# Patient Record
Sex: FEMALE | Race: BLACK OR AFRICAN AMERICAN | URBAN - METROPOLITAN AREA
[De-identification: names, ages, dates, MRNs, and addresses within clinical notes are randomized per-mention and may not be internally consistent; named-entity substitution may affect disease eponyms.]

---

## 2018-01-03 ENCOUNTER — OFFICE VISIT (OUTPATIENT)
Dept: FAMILY MEDICINE CLINIC | Age: 28
End: 2018-01-03

## 2018-01-03 VITALS
BODY MASS INDEX: 42.13 KG/M2 | WEIGHT: 209 LBS | SYSTOLIC BLOOD PRESSURE: 138 MMHG | DIASTOLIC BLOOD PRESSURE: 77 MMHG | OXYGEN SATURATION: 98 % | HEART RATE: 82 BPM | TEMPERATURE: 97.6 F | HEIGHT: 59 IN | RESPIRATION RATE: 20 BRPM

## 2018-01-03 DIAGNOSIS — Z13.0 SCREENING FOR ENDOCRINE, METABOLIC AND IMMUNITY DISORDER: ICD-10-CM

## 2018-01-03 DIAGNOSIS — Z13.6 ENCOUNTER FOR LIPID SCREENING FOR CARDIOVASCULAR DISEASE: ICD-10-CM

## 2018-01-03 DIAGNOSIS — Z13.220 ENCOUNTER FOR LIPID SCREENING FOR CARDIOVASCULAR DISEASE: ICD-10-CM

## 2018-01-03 DIAGNOSIS — Z11.3 SCREENING FOR BACTERIAL AND SPIROCHETAL SEXUALLY TRANSMITTED DISEASES: ICD-10-CM

## 2018-01-03 DIAGNOSIS — Z11.59 SCREENING FOR VIRAL DISEASE: ICD-10-CM

## 2018-01-03 DIAGNOSIS — Z13.29 SCREENING FOR ENDOCRINE, METABOLIC AND IMMUNITY DISORDER: ICD-10-CM

## 2018-01-03 DIAGNOSIS — Z13.228 SCREENING FOR ENDOCRINE, METABOLIC AND IMMUNITY DISORDER: ICD-10-CM

## 2018-01-03 DIAGNOSIS — E66.01 MORBID OBESITY WITH BMI OF 40.0-44.9, ADULT (HCC): ICD-10-CM

## 2018-01-03 DIAGNOSIS — Z13.1 SCREENING FOR DIABETES MELLITUS: ICD-10-CM

## 2018-01-03 DIAGNOSIS — Z00.00 PHYSICAL EXAM, ANNUAL: Primary | ICD-10-CM

## 2018-01-03 DIAGNOSIS — Z11.3 SCREEN FOR STD (SEXUALLY TRANSMITTED DISEASE): ICD-10-CM

## 2018-01-03 NOTE — PROGRESS NOTES
Chief Complaint   Patient presents with   [facility].    Complete Physical     1. Have you been to the ER, urgent care clinic since your last visit? Hospitalized since your last visit? No    2. Have you seen or consulted any other health care providers outside of the [address] [place] [address] since your last visit? Include any pap smears or colon screening.  No

## 2018-01-03 NOTE — PATIENT INSTRUCTIONS
Learning About Healthy Weight  What is a healthy weight? A healthy weight is the weight at which you feel good about yourself and have energy for work and play. It's also one that lowers your risk for health problems. What can you do to stay at a healthy weight? It can be hard to stay at a healthy weight, especially when fast food, vending-machine snacks, and processed foods are so easy to find. And with your busy lifestyle, activity may be low on your list of things to do. But staying at a healthy weight may be easier than you think. Here are some dos and don'ts for staying at a healthy weight:  Do eat healthy foods  The kinds of foods you eat have a big impact on both your weight and your health. Reaching and staying at a healthy weight is not about going on a diet. It's about making healthier food choices every day and changing your diet for good. Healthy eating means eating a variety of foods so that you get all the nutrients you need. Your body needs protein, carbohydrate, and fats for energy. They keep your heart beating, your brain active, and your muscles working. On most days, try to eat from each food group. This means eating a variety of:  · Whole grains, such as whole wheat breads and pastas. · Fruits and vegetables. · Dairy products, such as low-fat milk, yogurt, and cheese. · Lean proteins, such as all types of fish, chicken without the skin, and beans. Don't have too much or too little of one thing. All foods, if eaten in moderation, can be part of healthy eating. Even sweets can be okay. If your favorite foods are high in fat, salt, sugar, or calories, limit how often you eat them. Eat smaller servings, or look for healthy substitutes. Do watch what you eat  Many people eat more than their bodies need. Part of staying at a healthy weight means learning how much food you really need from day to day and not eating more than that.  Even with healthy foods, eating too much can make you gain weight. Having a well-balanced diet means that you eat enough, but not too much, and that your food gives you the nutrients you need to stay healthy. So listen to your body. Eat when you're hungry. Stop when you feel satisfied. It's a good idea to have healthy snacks ready for when you get hungry. Keep healthy snacks with you at work, in your car, and at home. If you have a healthy snack easily available, you'll be less likely to pick a candy bar or bag of chips from a vending machine instead. Some healthy snacks you might want to keep on hand are fruit, low-fat yogurt, string cheese, low-fat microwave popcorn, raisins and other dried fruit, nuts, whole wheat crackers, pretzels, carrots, celery sticks, and broccoli. Do some physical activity  A big part of reaching and staying at a healthy weight is being active. When you're active, you burn calories. This makes it easier to reach and stay at a healthy weight. When you're active on a regular basis, your body burns more calories, even when you're at rest. Being active helps you lose fat and build lean muscle. Try to be active for at least 1 hour every day. This may sound like a lot, but it's okay to be active in smaller blocks of time that add up to 1 hour a day. Any activity that makes your heart beat faster and keeps it there for a while counts. A brisk walk, run, or swim will get your heart beating faster. So will climbing stairs, shooting baskets, or cycling. Even some household chores like vacuuming and mowing the lawn will get your heart rate up. Pick activities that you enjoy-ones that make your heart beat faster, your muscles stronger, and your muscles and joints more flexible. If you find more than one thing you like doing, do them all. You don't have to do the same thing every day. Don't diet  Diets don't work. Diets are temporary. Because you give up so much when you diet, you may be hungry and think about food all the time.  And after you stop dieting, you also may overeat to make up for what you missed. Most people who diet end up gaining back the pounds they lost-and more. Remember that healthy bodies come in lots of shapes and sizes. Everyone can get healthier by eating better and being more active. Where can you learn more? Go to http://savanah-raudel.info/. Enter 217 0402 in the search box to learn more about \"Learning About Healthy Weight. \"  Current as of: October 13, 2016  Content Version: 11.4  © 5252-5898 Healthwise, Incorporated. Care instructions adapted under license by [employer] (which disclaims liability or warranty for this information). If you have questions about a medical condition or this instruction, always ask your healthcare professional. [address] any warranty or liability for your use of this information.

## 2018-01-03 NOTE — PROGRESS NOTES
[facility] OFFICE NOTE    Martha Cardenas is a 32 y.o. female presenting today for office visit. 1/3/2018  12:41 PM    Chief Complaint   Patient presents with    Establish Care    Complete Physical       HPI: Here today as a new patient, establishing care. Reports no previous PCP. Does not follow with any specialists. Would like to have routine labs completed. No complaints today. Review of Systems   Constitutional: Negative for chills, fatigue and fever. Respiratory: Negative for cough, shortness of breath and wheezing. Cardiovascular: Negative for chest pain, palpitations and leg swelling. Gastrointestinal: Negative for abdominal pain, constipation, diarrhea, nausea and vomiting. Genitourinary: Negative for difficulty urinating and frequency. Musculoskeletal: Negative for arthralgias and myalgias. Skin: Negative for rash. Neurological: Negative for dizziness and headaches. PHQ Screening   PHQ over the last two weeks 1/3/2018   Little interest or pleasure in doing things Not at all   Feeling down, depressed or hopeless Not at all   Total Score PHQ 2 0         History  No past medical history on file. No past surgical history on file. Social History     Social History    Marital status: UNKNOWN     Spouse name: N/A    Number of children: N/A    Years of education: N/A     Occupational History    Not on file. Social History Main Topics    Smoking status: Former Smoker    Smokeless tobacco: Former User    Alcohol use No    Drug use: No    Sexual activity: Not on file     Other Topics Concern    Not on file     Social History Narrative    No narrative on file       Family History   Problem Relation Age of Onset    No Known Problems Mother     No Known Problems Father     Asthma Sister     Asthma Brother        No Known Allergies    No current daily medications      Health Maintenance and Screenings  Colon Cancer: . Not indicated for early screening   COPD Screen/ Lung Cancer: Nonsmoker  CAD Screen: Lipid panel ordered  Diabetes: Glucose ordered  STD and HIV Screen: She opts to have done with labs- ordered  Breast Cancer: Not indicated for early screening   Cervical Cancer: Well woman recommended  Osteoporosis Screen: Not indicated for early screening   Abdominal Aneurysm Screen: Not indicated  Opthalmology:   Dentistry Services:   Hearing Impairment:   Skin Cancer Screen:   Obesity Screen: Body mass index is 42.21 kg/(m^2). Flu Vaccination: Declines  Pneumococcal Vaccine: Not indicated for early vaccination   Shingles Vaccine: Not indicated for early vaccination   Tetanus Booster: Last booster in 2012- UTD  Hepatitis B Vaccinations: Unsure if she had series- declines  HPV series:     *will follow up on      Advance Care Planning:   Patient was offered the opportunity to discuss advance care planning NO   Does patient have an Advance Directive:  NO   If no, did you provide information on Caring Connections? Patient Care Team:  Patient Care Team:  Jennifer Gonzalez NP as PCP - General (Nurse Practitioner)        LABS:  None new to review    RADIOLOGY:  None new to review        Physical Exam   Constitutional: She is oriented to person, place, and time. She appears well-developed and well-nourished. No distress. Neck: Normal range of motion. Neck supple. No thyromegaly present. Cardiovascular: Normal rate, regular rhythm and normal heart sounds. No murmur heard. Pulmonary/Chest: Effort normal and breath sounds normal. No respiratory distress. Abdominal: Soft. Bowel sounds are normal. There is no tenderness. Musculoskeletal: She exhibits no edema. Neurological: She is alert and oriented to person, place, and time. She exhibits normal muscle tone. Coordination and gait normal.   Skin: Skin is warm and dry.          Vitals:    01/03/18 1237   BP: 138/77   Pulse: 82   Resp: 18   Temp: 97.6 °F (36.4 °C)   TempSrc: Oral   SpO2: 98%   Weight: 209 lb (94.8 kg) Height: 4' 11\" (1.499 m)   PainSc:   0 - No pain   LMP: 12/25/2017         Assessment and Plan    Physical exam, annual  *CPE completed today in office. Preventative plan established. Declines PAP today- will come in for well woman. Morbid obesity with BMI of 40.0-44.9, adult ([facility] [address]  *I have reviewed/discussed the above normal BMI with the patient. I have recommended the following interventions: dietary management education, guidance, and counseling, encourage exercise and monitor weight . Screening for diabetes mellitus  - METABOLIC PANEL, COMPREHENSIVE; Future    Encounter for lipid screening for cardiovascular disease  - LIPID PANEL; Future    Screening for endocrine, metabolic and immunity disorder  - TSH 3RD GENERATION; Future  - URINALYSIS W/MICROSCOPIC; Future  - CBC W/O DIFF; Future    Screening for viral disease/ Screening for bacterial and spirochetal sexually transmitted diseases/ Screen for STD (sexually transmitted disease)  - RPR; Future  - HEPATITIS C AB; Future  - HEP B SURFACE AG; Future  - HIV 1/2 AG/AB, 4TH GENERATION,W RFLX CONFIRM; Future          *Plan of care reviewed with patient. Patient in agreement with plan and expresses understanding. All questions answered and patient encouraged to call or RTO if further questions or concerns. Follow-up Disposition:  Return for well-woman visit with fasting labs- 30 mins.

## 2018-01-03 NOTE — MR AVS SNAPSHOT
Visit Information Date & Time Provider Department Dept. Phone Encounter #  
 1/3/2018 12:30 PM Yuly Poon NP [facility] [id number] Follow-up Instructions Return for well-woman visit with fasting labs- 30 mins. Upcoming Health Maintenance Date Due DTaP/Tdap/Td series (1 - Tdap) 5/9/2011 PAP AKA CERVICAL CYTOLOGY 5/9/2011 Allergies as of 1/3/2018  Review Complete On: 1/3/2018 By: Yuly Poon NP No Known Allergies Current Immunizations  Never Reviewed Name Date Tdap 7/4/2012 12:00 AM  
  
 Not reviewed this visit You Were Diagnosed With   
  
 Codes Comments Physical exam, annual    -  Primary ICD-10-CM: Z00.00 ICD-9-CM: V70.0 Morbid obesity with BMI of 40.0-44.9, adult (HCC)     ICD-10-CM: E66.01, Z68.41 
ICD-9-CM: 278.01, V85.41 Screening for diabetes mellitus     ICD-10-CM: Z13.1 ICD-9-CM: V77.1 Encounter for lipid screening for cardiovascular disease     ICD-10-CM: Z13.220, Z13.6 ICD-9-CM: V77.91, V81.2 Screening for endocrine, metabolic and immunity disorder     ICD-10-CM: Z13.29, Z13.228, Z13.0 ICD-9-CM: V77.99 Encounter for laboratory examination     ICD-10-CM: Z01.89 ICD-9-CM: V72.60 Screen for STD (sexually transmitted disease)     ICD-10-CM: Z11.3 ICD-9-CM: V74.5 Screening for viral disease     ICD-10-CM: Z11.59 
ICD-9-CM: V73.99 Screening for bacterial and spirochetal sexually transmitted diseases     ICD-10-CM: Z11.3 ICD-9-CM: V74.5 Vitals BP Pulse Temp Resp Height(growth percentile) Weight(growth percentile) 138/77 (BP 1 Location: Left arm, BP Patient Position: Sitting) 82 97.6 °F (36.4 °C) (Oral) 20 4' 11\" (1.499 m) 209 lb (94.8 kg) LMP SpO2 BMI Smoking Status 12/25/2017 98% 42.21 kg/m2 Never Smoker Vitals History BMI and BSA Data  Body Mass Index Body Surface Area  
 42.21 kg/m 2 1.99 m 2  
  
  
 Your Updated Medication List  
  
Notice  As of 1/3/2018 12:57 PM  
 You have not been prescribed any medications. Follow-up Instructions Return for well-woman visit with fasting labs- 30 mins. To-Do List   
 01/03/2018 Lab:  HEP B SURFACE AG   
  
 01/03/2018 Lab:  HEPATITIS C AB   
  
 01/03/2018 Lab:  HIV 1/2 AG/AB, 4TH GENERATION,W RFLX CONFIRM Around 01/04/2018 Lab:  CBC W/O DIFF Around 01/04/2018 Lab:  LIPID PANEL Around 01/04/2018 Lab:  METABOLIC PANEL, COMPREHENSIVE Around 01/04/2018 Lab:  RPR Around 01/04/2018 Lab:  TSH 3RD GENERATION Around 01/04/2018 Lab:  URINALYSIS W/MICROSCOPIC Patient Instructions Learning About Healthy Weight What is a healthy weight? A healthy weight is the weight at which you feel good about yourself and have energy for work and play. It's also one that lowers your risk for health problems. What can you do to stay at a healthy weight? It can be hard to stay at a healthy weight, especially when fast food, vending-machine snacks, and processed foods are so easy to find. And with your busy lifestyle, activity may be low on your list of things to do. But staying at a healthy weight may be easier than you think. Here are some dos and don'ts for staying at a healthy weight: 
Do eat healthy foods The kinds of foods you eat have a big impact on both your weight and your health. Reaching and staying at a healthy weight is not about going on a diet. It's about making healthier food choices every day and changing your diet for good. Healthy eating means eating a variety of foods so that you get all the nutrients you need. Your body needs protein, carbohydrate, and fats for energy. They keep your heart beating, your brain active, and your muscles working. On most days, try to eat from each food group. This means eating a variety of: · Whole grains, such as whole wheat breads and pastas. · Fruits and vegetables. · Dairy products, such as low-fat milk, yogurt, and cheese. · Lean proteins, such as all types of fish, chicken without the skin, and beans. Don't have too much or too little of one thing. All foods, if eaten in moderation, can be part of healthy eating. Even sweets can be okay. If your favorite foods are high in fat, salt, sugar, or calories, limit how often you eat them. Eat smaller servings, or look for healthy substitutes. Do watch what you eat Many people eat more than their bodies need. Part of staying at a healthy weight means learning how much food you really need from day to day and not eating more than that. Even with healthy foods, eating too much can make you gain weight. Having a well-balanced diet means that you eat enough, but not too much, and that your food gives you the nutrients you need to stay healthy. So listen to your body. Eat when you're hungry. Stop when you feel satisfied. It's a good idea to have healthy snacks ready for when you get hungry. Keep healthy snacks with you at work, in your car, and at home. If you have a healthy snack easily available, you'll be less likely to pick a candy bar or bag of chips from a vending machine instead. Some healthy snacks you might want to keep on hand are fruit, low-fat yogurt, string cheese, low-fat microwave popcorn, raisins and other dried fruit, nuts, whole wheat crackers, pretzels, carrots, celery sticks, and broccoli. Do some physical activity A big part of reaching and staying at a healthy weight is being active. When you're active, you burn calories. This makes it easier to reach and stay at a healthy weight. When you're active on a regular basis, your body burns more calories, even when you're at rest. Being active helps you lose fat and build lean muscle. Try to be active for at least 1 hour every day.  This may sound like a lot, but it's okay to be active in smaller blocks of time that add up to 1 hour a day. Any activity that makes your heart beat faster and keeps it there for a while counts. A brisk walk, run, or swim will get your heart beating faster. So will climbing stairs, shooting baskets, or cycling. Even some household chores like vacuuming and mowing the lawn will get your heart rate up. Pick activities that you enjoy-ones that make your heart beat faster, your muscles stronger, and your muscles and joints more flexible. If you find more than one thing you like doing, do them all. You don't have to do the same thing every day. Don't diet Diets don't work. Diets are temporary. Because you give up so much when you diet, you may be hungry and think about food all the time. And after you stop dieting, you also may overeat to make up for what you missed. Most people who diet end up gaining back the pounds they lost-and more. Remember that healthy bodies come in lots of shapes and sizes. Everyone can get healthier by eating better and being more active. Where can you learn more? Go to http://savanahNanoDynamicsraudel.info/. Enter 768 7869 in the search box to learn more about \"Learning About Healthy Weight. \" Current as of: October 13, 2016 Content Version: 11.4 © 2294-2231 Healthwise, Incorporated. Care instructions adapted under license by [employer] (which disclaims liability or warranty for this information). If you have questions about a medical condition or this instruction, always ask your healthcare professional. [place] [address] any warranty or liability for your use of this information. Introducing [address]! [facility] introduces [employer] patient portal. Now you can access parts of your medical record, email your doctor's office, and request medication refills online. 1. In your internet browser, go to [email or web address] 2. Click on the First Time User? Click Here link in the Sign In box. You will see the New Member Sign Up page. 3. Enter your Convoe Access Code exactly as it appears below. You will not need to use this code after youve completed the sign-up process. If you do not sign up before the expiration date, you must request a new code. · Convoe Access Code: [email or web address] Expires: 1/16/2018  8:35 AM 
 
4. Enter the last four digits of your Social Security Number (xxxx) and Date of Birth (mm/dd/yyyy) as indicated and click Submit. You will be taken to the next sign-up page. 5. Create a Convoe ID. This will be your Convoe login ID and cannot be changed, so think of one that is secure and easy to remember. 6. Create a Convoe password. You can change your password at any time. 7. Enter your Password Reset Question and Answer. This can be used at a later time if you forget your password. 8. Enter your e-mail address. You will receive e-mail notification when new information is available in [address] 9. Click Sign Up. You can now view and download portions of your medical record. 10. Click the Download Summary menu link to download a portable copy of your medical information. If you have questions, please visit the Frequently Asked Questions section of the Convoe website. Remember, Convoe is NOT to be used for urgent needs. For medical emergencies, dial 911. Now available from your iPhone and Android! Please provide this summary of care documentation to your next provider. Your primary care clinician is listed as Lacy Zamora. If you have any questions after today's visit, please call [phone].

## 2018-09-14 ENCOUNTER — PATIENT OUTREACH (OUTPATIENT)
Dept: FAMILY MEDICINE CLINIC | Age: 28
End: 2018-09-14

## 2018-12-18 ENCOUNTER — PATIENT OUTREACH (OUTPATIENT)
Dept: FAMILY MEDICINE CLINIC | Age: 28
End: 2018-12-18

## 2019-03-29 ENCOUNTER — TELEPHONE (OUTPATIENT)
Dept: FAMILY MEDICINE CLINIC | Age: 29
End: 2019-03-29

## 2019-03-29 ENCOUNTER — OFFICE VISIT (OUTPATIENT)
Dept: FAMILY MEDICINE CLINIC | Age: 29
End: 2019-03-29

## 2019-03-29 ENCOUNTER — HOSPITAL ENCOUNTER (OUTPATIENT)
Dept: LAB | Age: 29
Discharge: HOME | End: 2019-03-29
Payer: SELF-PAY

## 2019-03-29 VITALS
OXYGEN SATURATION: 98 % | WEIGHT: 196 LBS | BODY MASS INDEX: 39.51 KG/M2 | DIASTOLIC BLOOD PRESSURE: 73 MMHG | TEMPERATURE: 98.1 F | RESPIRATION RATE: 16 BRPM | HEIGHT: 59 IN | SYSTOLIC BLOOD PRESSURE: 111 MMHG | HEART RATE: 106 BPM

## 2019-03-29 DIAGNOSIS — T78.40XS ALLERGIC REACTION, SEQUELA: ICD-10-CM

## 2019-03-29 DIAGNOSIS — N89.8 VAGINAL SORE: Primary | ICD-10-CM

## 2019-03-29 DIAGNOSIS — N89.8 VAGINAL SORE: ICD-10-CM

## 2019-03-29 PROCEDURE — 87529 HSV DNA AMP PROBE: CPT

## 2019-03-29 RX ORDER — LIDOCAINE HYDROCHLORIDE 20 MG/ML
JELLY TOPICAL
Qty: 5 ML | Refills: 0 | Status: SHIPPED | OUTPATIENT
Start: 2019-03-29 | End: 2019-03-29 | Stop reason: SDUPTHER

## 2019-03-29 RX ORDER — DIPHENHYDRAMINE HCL 25 MG
25 TABLET ORAL
COMMUNITY
End: 2020-08-20

## 2019-03-29 RX ORDER — LIDOCAINE HYDROCHLORIDE 20 MG/ML
JELLY TOPICAL
Qty: 5 ML | Refills: 0 | Status: SHIPPED | OUTPATIENT
Start: 2019-03-29 | End: 2020-08-20

## 2019-03-29 NOTE — PATIENT INSTRUCTIONS
*Your results for the swab today in the office will be available in about 3-5 days. You will not receive a phone call unless it is abnormal. If you do not receive a phone call, then please check your Wordlock in-basket for your results. If you do not have a Wordlock account, a letter will be sent in the regular mail to your address on file and may take up to 2 weeks to arrive.

## 2019-03-29 NOTE — PROGRESS NOTES
OFFICE NOTE    Kathie Fairchild is a 29 y.o. female presenting today for office visit. 3/29/2019  4:46 PM      Chief Complaint   Patient presents with    Rash     whole body and vaginal rash x2 days    Vaginal Itching     and burning sensation         HPI: Here today for complaints of possible allergic reaction and vaginal soreness. She reports that about 3 days ago she had someone wash her clothes and it was with Tide detergent causing an allergic reaction. Had itching all over and took some Benadryl, did help some. Stopped itching all over this morning but has soreness and irritation in the vaginal area. She has tried some OTC ointment to the vaginal area- did not really seem to help. No SOB, wheezing, tongue or mouth swelling. Review of Systems   Constitutional: Negative for chills, fatigue and fever. HENT: Negative for facial swelling, rhinorrhea, sinus pressure, sneezing, sore throat and trouble swallowing. Eyes: Negative for pain, discharge and itching. Respiratory: Negative for cough, shortness of breath and wheezing. Cardiovascular: Negative for chest pain. Gastrointestinal: Negative for abdominal pain, diarrhea, nausea and vomiting. Genitourinary: Positive for genital sores. Negative for dysuria, frequency, pelvic pain, urgency and vaginal discharge. Skin: Positive for rash. Neurological: Negative for light-headedness, numbness and headaches. PHQ Screening   3 most recent PHQ Screens 1/3/2018   Little interest or pleasure in doing things Not at all   Feeling down, depressed, irritable, or hopeless Not at all   Total Score PHQ 2 0         History  History reviewed. No pertinent past medical history. History reviewed. No pertinent surgical history.     Social History     Socioeconomic History    Marital status: UNKNOWN     Spouse name: Not on file    Number of children: Not on file    Years of education: Not on file    Highest education level: Not on file Occupational History    Not on file   Social Needs    Financial resource strain: Not on file    Food insecurity:     Worry: Not on file     Inability: Not on file    Transportation needs:     Medical: Not on file     Non-medical: Not on file   Tobacco Use    Smoking status: Never Smoker    Smokeless tobacco: Never Used   Substance and Sexual Activity    Alcohol use: No    Drug use: No    Sexual activity: Yes     Partners: Female   Lifestyle    Physical activity:     Days per week: Not on file     Minutes per session: Not on file    Stress: Not on file   Relationships    Social connections:     Talks on phone: Not on file     Gets together: Not on file     Attends [employer] service: Not on file     Active member of club or organization: Not on file     Attends meetings of clubs or organizations: Not on file     Relationship status: Not on file    Intimate partner violence:     Fear of current or ex partner: Not on file     Emotionally abused: Not on file     Physically abused: Not on file     Forced sexual activity: Not on file   Other Topics Concern    Not on file   Social History Narrative    Not on file       No Known Allergies    Current Outpatient Medications   Medication Sig Dispense Refill    diphenhydrAMINE (BENADRYL ALLERGY) 25 mg tablet Take 25 mg by mouth every six (6) hours as needed for Sleep. Patient Care Team:  Patient Care Team:  Frank Rhodes NP as PCP - General (Nurse Practitioner)        LABS:  None new to review    RADIOLOGY:  None new to review      Physical Exam   Constitutional: She is oriented to person, place, and time. She appears well-developed and well-nourished. No distress. HENT:   Mouth/Throat: Uvula is midline, oropharynx is clear and moist and mucous membranes are normal. No uvula swelling. Pulmonary/Chest: Effort normal. No respiratory distress. Abdominal: Soft. Bowel sounds are normal. There is no tenderness.    Genitourinary:       There is lesion on the right labia. There is no rash or tenderness on the right labia. There is lesion on the left labia. There is no rash or tenderness on the left labia. No bleeding in the vagina. No foreign body in the vagina. No vaginal discharge found. Genitourinary Comments: -few small ulcerated lesions noted to labia; no erythema but tender to touch   Lymphadenopathy:        Right: No inguinal adenopathy present. Left: No inguinal adenopathy present. Neurological: She is alert and oriented to person, place, and time. Skin: Skin is warm and dry. Vitals:    03/29/19 1623   BP: 111/73   Pulse: (!) 106   Resp: 16   Temp: 98.1 °F (36.7 °C)   TempSrc: Oral   SpO2: 98%   Weight: 196 lb (88.9 kg)   Height: 4' 11\" (1.499 m)   PainSc:   3         Assessment and Plan    Vaginal sore  *Discussed about possible causes- will do HSV culture. PRN lidocaine jelly- advised on not overusing. PRN Motrin or Tylenol. Avoid sexual activity until returned. Also advised that it could have just been from the itching so if it recurs, use PRN Benadryl and cool compresses. - lidocaine (XYLOCAINE) 2 % jelly; Use pea sized amount to sore areas every 12 hours as needed for a maximum of 5 days  Dispense: 5 mL; Refill: 0  - HERPES SIMPLEX VIRUS (HSV) [place]; Future    Allergic reaction, sequela  *She has had improvement. Continue with Benadryl PRN if itching begins. *Plan of care reviewed with patient. Patient in agreement with plan and expresses understanding. All questions answered and patient encouraged to call or RTO if further questions or concerns. Follow-up and Dispositions    · Return if symptoms worsen or fail to improve.

## 2019-03-30 NOTE — TELEPHONE ENCOUNTER
Patient and mother called answering service again regarding  itching complaints in vaginal region. See previous telephone encounter. Patient was advised in last encounter to take benadryl 100 mg po every six hours as needed for itching. Patient and mother were not happy with this and wanted a prescription. Advised that viral diagnostics are not back so antiviral could not be prescribed at this time. However, she could use topical hydrocortisone 1% to genital area every six hours prn. Patient agrees with plan and verbalizes understanding. Anticipatory guidance.   Call back if needed  [facility]

## 2019-03-31 LAB
HSV1 DNA SPEC QL NAA+PROBE: POSITIVE
HSV2 DNA SPEC QL NAA+PROBE: NEGATIVE
SPECIMEN SOURCE: ABNORMAL

## 2019-04-02 PROBLEM — B00.9 HERPES SIMPLEX: Status: ACTIVE | Noted: 2019-03-01

## 2019-08-07 ENCOUNTER — TELEPHONE (OUTPATIENT)
Dept: FAMILY MEDICINE CLINIC | Age: 29
End: 2019-08-07

## 2019-08-07 DIAGNOSIS — Z13.1 SCREENING FOR DIABETES MELLITUS: Primary | ICD-10-CM

## 2019-08-07 DIAGNOSIS — Z13.220 ENCOUNTER FOR LIPID SCREENING FOR CARDIOVASCULAR DISEASE: ICD-10-CM

## 2019-08-07 DIAGNOSIS — Z13.6 ENCOUNTER FOR LIPID SCREENING FOR CARDIOVASCULAR DISEASE: ICD-10-CM

## 2019-08-07 DIAGNOSIS — Z13.228 SCREENING FOR ENDOCRINE, METABOLIC AND IMMUNITY DISORDER: ICD-10-CM

## 2019-08-07 DIAGNOSIS — Z13.0 SCREENING FOR ENDOCRINE, METABOLIC AND IMMUNITY DISORDER: ICD-10-CM

## 2019-08-07 DIAGNOSIS — Z13.29 SCREENING FOR ENDOCRINE, METABOLIC AND IMMUNITY DISORDER: ICD-10-CM

## 2019-08-07 NOTE — TELEPHONE ENCOUNTER
Patient's mother called regarding upcoming CPE appointment scheduled 9/3/19. Patient's mother is requesting labs be ordered, so they can be done and discussed at this visit.

## 2019-08-08 NOTE — TELEPHONE ENCOUNTER
8/8/2019  11:25 AM    Chief Complaint   Patient presents with    Labs       Routine fasting labs ordered for upcoming physical. Forwarded to clinical staff so that patient can get labs completed.

## 2019-08-08 NOTE — TELEPHONE ENCOUNTER
Call made to pt's mother and made aware that labs were entered into the system and she could walk in to have labs done or call to schedule an appointment.

## 2020-08-20 ENCOUNTER — OFFICE VISIT (OUTPATIENT)
Dept: FAMILY MEDICINE CLINIC | Age: 30
End: 2020-08-20

## 2020-08-20 VITALS
DIASTOLIC BLOOD PRESSURE: 78 MMHG | BODY MASS INDEX: 46.12 KG/M2 | OXYGEN SATURATION: 97 % | WEIGHT: 228.8 LBS | HEIGHT: 59 IN | TEMPERATURE: 98.2 F | RESPIRATION RATE: 20 BRPM | SYSTOLIC BLOOD PRESSURE: 124 MMHG | HEART RATE: 92 BPM

## 2020-08-20 DIAGNOSIS — Z83.3 FAMILY HISTORY OF DIABETES MELLITUS: Primary | ICD-10-CM

## 2020-08-20 DIAGNOSIS — E66.01 CLASS 3 SEVERE OBESITY DUE TO EXCESS CALORIES WITHOUT SERIOUS COMORBIDITY WITH BODY MASS INDEX (BMI) OF 45.0 TO 49.9 IN ADULT (HCC): ICD-10-CM

## 2020-08-20 DIAGNOSIS — R73.03 PREDIABETES: ICD-10-CM

## 2020-08-20 LAB — HBA1C MFR BLD HPLC: 5.9 %

## 2020-08-20 NOTE — PATIENT INSTRUCTIONS
Prediabetes: Care Instructions Overview Prediabetes is a warning sign that you're at risk for getting type 2 diabetes. It means that your blood sugar is higher than it should be. But it's not high enough to be diabetes. The food you eat naturally turns into sugar. Your body uses the sugar for energy. Normally, an organ called the pancreas makes insulin. And insulin allows the sugar in your blood to get into your body's cells. But sometimes the body can't use insulin the right way. So the sugar stays in your blood instead. This is called insulin resistance. The buildup of sugar in your blood means you have prediabetes. The good news is that you may be able to prevent or delay diabetes. Making small lifestyle changes, like getting active and changing your eating habits, may help you get your blood sugar back to normal. You can work with your doctor to make a treatment plan. Follow-up care is a key part of your treatment and safety. Be sure to make and go to all appointments, and call your doctor if you are having problems. It's also a good idea to know your test results and keep a list of the medicines you take. How can you care for yourself at home? · Watch your weight. A healthy weight helps your body use insulin properly. · Limit the amount of calories, sweets, and unhealthy fat you eat. Ask your doctor if you should see a dietitian. A registered dietitian can help you create meal plans that fit your lifestyle. · Get at least 30 minutes of exercise on most days of the week. Exercise helps control your blood sugar. It also helps you maintain a healthy weight. Walking is a good choice. You also may want to do other activities, such as running, swimming, cycling, or playing tennis or team sports. · Do not smoke. Smoking can make prediabetes worse. If you need help quitting, talk to your doctor about stop-smoking programs and medicines. These can increase your chances of quitting for good. · If your doctor prescribed medicines, take them exactly as prescribed. Call your doctor if you think you are having a problem with your medicine. You will get more details on the specific medicines your doctor prescribes. When should you call for help? Watch closely for changes in your health, and be sure to contact your doctor if: 
· You have any symptoms of diabetes. These may include: 
? Being thirsty more often. ? Urinating more. ? Being hungrier. ? Losing weight. ? Being very tired. ? Having blurry vision. · You have a wound that will not heal. 
· You have an infection that will not go away. · You have problems with your blood pressure. · You want more information about diabetes and how you can keep from getting it. Where can you learn more? Go to http://savanah-raudel.info/ Enter I222 in the search box to learn more about \"Prediabetes: Care Instructions. \" Current as of: December 20, 2019               Content Version: 12.5 © 1670-5274 [employer]. Care instructions adapted under license by [employer] (which disclaims liability or warranty for this information). If you have questions about a medical condition or this instruction, always ask your healthcare professional. [address] any warranty or liability for your use of this information. Starting a Weight Loss Plan: Care Instructions Your Care Instructions If you are thinking about losing weight, it can be hard to know where to start. Your doctor can help you set up a weight loss plan that best meets your needs. You may want to take a class on nutrition or exercise, or join a weight loss support group. If you have questions about how to make changes to your eating or exercise habits, ask your doctor about seeing a registered dietitian or an exercise specialist. 
It can be a big challenge to lose weight.  But you do not have to make huge changes at once. Make small changes, and stick with them. When those changes become habit, add a few more changes. If you do not think you are ready to make changes right now, try to pick a date in the future. Make an appointment to see your doctor to discuss whether the time is right for you to start a plan. Follow-up care is a key part of your treatment and safety. Be sure to make and go to all appointments, and call your doctor if you are having problems. It's also a good idea to know your test results and keep a list of the medicines you take. How can you care for yourself at home? · Set realistic goals. Many people expect to lose much more weight than is likely. A weight loss of 5% to 10% of your body weight may be enough to improve your health. · Get family and friends involved to provide support. Talk to them about why you are trying to lose weight, and ask them to help. They can help by participating in exercise and having meals with you, even if they may be eating something different. · Find what works best for you. If you do not have time or do not like to cook, a program that offers meal replacement bars or shakes may be better for you. Or if you like to prepare meals, finding a plan that includes daily menus and recipes may be best. 
· Ask your doctor about other health professionals who can help you achieve your weight loss goals. ? A dietitian can help you make healthy changes in your diet. ? An exercise specialist or  can help you develop a safe and effective exercise program. 
? A counselor or psychiatrist can help you cope with issues such as depression, anxiety, or family problems that can make it hard to focus on weight loss. · Consider joining a support group for people who are trying to lose weight. Your doctor can suggest groups in your area. Where can you learn more? Go to http://savanah-raudel.info/ Enter U384 in the search box to learn more about \"Starting a Weight Loss Plan: Care Instructions. \" Current as of: December 11, 2019               Content Version: 12.5 © 3483-6483 [employer]. Care instructions adapted under license by [employer] (which disclaims liability or warranty for this information). If you have questions about a medical condition or this instruction, always ask your healthcare professional. [place] [address] any warranty or liability for your use of this information.

## 2020-08-20 NOTE — PROGRESS NOTES
Antonia Holcomb presents today for   Chief Complaint   Patient presents with    Other     Diabetes concerns       Is someone accompanying this pt? no    Is the patient using any DME equipment during OV? no    Depression Screening:  3 most recent PHQ Screens 8/20/2020   Little interest or pleasure in doing things Not at all   Feeling down, depressed, irritable, or hopeless Not at all   Total Score PHQ 2 0   Trouble falling or staying asleep, or sleeping too much Not at all   Feeling tired or having little energy Not at all   Poor appetite, weight loss, or overeating Not at all   Feeling bad about yourself - or that you are a failure or have let yourself or your family down Not at all   Trouble concentrating on things such as school, work, reading, or watching TV Not at all   Moving or speaking so slowly that other people could have noticed; or the opposite being so fidgety that others notice Not at all   Thoughts of being better off dead, or hurting yourself in some way Not at all   PHQ 9 Score 0       Learning Assessment:  No flowsheet data found. Abuse Screening:  No flowsheet data found. Fall Risk  No flowsheet data found. Health Maintenance reviewed and discussed and ordered per Provider. Health Maintenance Due   Topic Date Due    PAP AKA CERVICAL CYTOLOGY  05/09/2011    Influenza Age 5 to Adult  08/01/2020   . Coordination of Care:  1. Have you been to the ER, urgent care clinic since your last visit? Hospitalized since your last visit? no    2. Have you seen or consulted any other health care providers outside of the [address] [place] [address] since your last visit? Include any pap smears or colon screening.  no

## 2020-08-20 NOTE — PROGRESS NOTES
OFFICE NOTE (SOAP)      8/20/2020  10:25 AM    Chief Complaint   Patient presents with    Other     Diabetes concerns       SUBJECTIVE:    HPI:   Bladimir Guajardo is a 27 y.o. female presenting today for office visit. Former patient of Davin Tavarez NP, but not seen since 3/2019. Today with concern for diabetes. Prediabetes-  patient with concerns for diabetes today due to feeling 'funny' after eating a lot of sweets and also feeling shaky and weak if she doesn't eat on schedule. She denies polyuria/dipsia/phagia, confusion, vision changes, weakness. She has family history of diabetes with uncle and grandmother with type II dm. POC A1c 5.9%- prediabetes today. Obesity- noted and discussed with patient 35 lb weight gain in last year. Her BMI is 46. 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 HM-  Due for pap smear, states she has never had one. Discussed cervical cancer screening and she is agreeable to return for well woman exam.     Review of Systems   Constitutional: Negative for chills, fatigue and fever. HENT: Negative for congestion and sore throat. Eyes: Negative for visual disturbance. Respiratory: Negative for cough, shortness of breath and wheezing. Cardiovascular: Negative for chest pain and palpitations. Gastrointestinal: Negative for abdominal pain, diarrhea, nausea and vomiting. Endocrine: Negative for cold intolerance, heat intolerance, polydipsia, polyphagia and polyuria. Weak and shaky if has not eaten, 'funny' feeling after sweet foods   Genitourinary: Negative for difficulty urinating and dysuria. Musculoskeletal: Negative for arthralgias and back pain. Neurological: Negative for dizziness, weakness and headaches. Hematological: Does not bruise/bleed easily. Psychiatric/Behavioral: Negative for dysphoric mood.          PHQ Screening   3 most recent PHQ Screens 8/20/2020   Little interest or pleasure in doing things Not at all   Feeling down, depressed, irritable, or hopeless Not at all   Total Score PHQ 2 0   Trouble falling or staying asleep, or sleeping too much Not at all   Feeling tired or having little energy Not at all   Poor appetite, weight loss, or overeating Not at all   Feeling bad about yourself - or that you are a failure or have let yourself or your family down Not at all   Trouble concentrating on things such as school, work, reading, or watching TV Not at all   Moving or speaking so slowly that other people could have noticed; or the opposite being so fidgety that others notice Not at all   Thoughts of being better off dead, or hurting yourself in some way Not at all   PHQ 9 Score 0         History  Past Medical History:   Diagnosis Date    Herpes simplex 03/2019    HSV type 1- vulvar       History reviewed. No pertinent surgical history.     Social History     Socioeconomic History    Marital status: UNKNOWN     Spouse name: Not on file    Number of children: Not on file    Years of education: Not on file    Highest education level: Not on file   Occupational History    Not on file   Social Needs    Financial resource strain: Not on file    Food insecurity     Worry: Not on file     Inability: Not on file    Transportation needs     Medical: Not on file     Non-medical: Not on file   Tobacco Use    Smoking status: Never Smoker    Smokeless tobacco: Never Used   Substance and Sexual Activity    Alcohol use: No    Drug use: No    Sexual activity: Yes     Partners: Female   Lifestyle    Physical activity     Days per week: Not on file     Minutes per session: Not on file    Stress: Not on file   Relationships    Social connections     Talks on phone: Not on file     Gets together: Not on file     Attends Anabaptist service: Not on file     Active member of club or organization: Not on file     Attends meetings of clubs or organizations: Not on file     Relationship status: Not on file    Intimate partner violence     Fear of current or ex partner: Not on file     Emotionally abused: Not on file     Physically abused: Not on file     Forced sexual activity: Not on file   Other Topics Concern    Not on file   Social History Narrative    Not on file       Family History   Problem Relation Age of Onset    No Known Problems Mother     No Known Problems Father     Asthma Sister     Asthma Brother        No Known Allergies        Patient Care Team:  Patient Care Team:  Sara Lopez NP as PCP - General (Nurse Practitioner)      LABS:  Lab Results   Component Value Date/Time    Hemoglobin A1c (POC) 5.9 08/20/2020 10:35 AM         RADIOLOGY:  None new to review      OBJECTIVE:      Physical Exam  Constitutional:       Appearance: She is well-developed. She is obese. Eyes:      General: No scleral icterus. Right eye: No discharge. Left eye: No discharge. Extraocular Movements: Extraocular movements intact. Neck:      Musculoskeletal: Neck supple. Thyroid: No thyromegaly. Cardiovascular:      Rate and Rhythm: Normal rate and regular rhythm. Heart sounds: No murmur. Pulmonary:      Effort: Pulmonary effort is normal. No respiratory distress. Breath sounds: Normal breath sounds. No wheezing. Musculoskeletal: Normal range of motion. Right lower leg: No edema. Left lower leg: No edema. Skin:     General: Skin is warm and dry. Neurological:      Mental Status: She is alert and oriented to person, place, and time. Psychiatric:         Mood and Affect: Mood normal.         Behavior: Behavior normal.           Vitals:    08/20/20 1018   BP: 124/84   Pulse: 92   Resp: 20   Temp: 98.2 °F (36.8 °C)   TempSrc: Oral   SpO2: 97%   Weight: 228 lb 12.8 oz (103.8 kg)   Height: 4' 11\" (1.499 m)   PainSc:   0 - No pain   LMP: 07/27/2020         Assessment & Plan:    1.  Family history of diabetes mellitus  - AMB POC HEMOGLOBIN A1C    2. Prediabetes  Entry a1c 5.9%. Discussed pathophysiology of DM and progression to DM likely without intervention. Discussed need for weight loss, as below, diet and daily exercise. 3. Obesity  I have reviewed/discussed the above normal BMI with the patient. I have recommended the following interventions: dietary management education, guidance, and counseling, encourage exercise and monitor weight . Ace De Oliveira Orders Placed This Encounter    AMB POC HEMOGLOBIN A1C       Follow-up and Dispositions    · Return in about 4 weeks (around 9/17/2020), or if symptoms worsen or fail to improve, for well woman at your convenience and follow up on pre-diabetes. Plan of care reviewed with patient. Patient in agreement with plan and expresses understanding. I have discussed when to anticipate results and how results will be communicated, if applicable. Anticipatory guidance given and questions answered, patient encouraged to call or RTO if further questions or concerns.     James Howard NP  08/20/20

## 2021-04-22 ENCOUNTER — VIRTUAL VISIT (OUTPATIENT)
Dept: FAMILY MEDICINE CLINIC | Age: 31
End: 2021-04-22
Payer: COMMERCIAL

## 2021-04-22 DIAGNOSIS — R05.9 COUGH: ICD-10-CM

## 2021-04-22 DIAGNOSIS — U07.1 COVID-19: ICD-10-CM

## 2021-04-22 DIAGNOSIS — R06.02 SOB (SHORTNESS OF BREATH) ON EXERTION: ICD-10-CM

## 2021-04-22 PROCEDURE — 99213 OFFICE O/P EST LOW 20 MIN: CPT | Performed by: NURSE PRACTITIONER

## 2021-04-22 RX ORDER — BENZONATATE 100 MG/1
CAPSULE ORAL
COMMUNITY
Start: 2021-04-19

## 2021-04-22 RX ORDER — ALBUTEROL SULFATE 90 UG/1
2 AEROSOL, METERED RESPIRATORY (INHALATION)
Qty: 1 INHALER | Refills: 1 | Status: SHIPPED | OUTPATIENT
Start: 2021-04-22

## 2021-04-22 NOTE — PROGRESS NOTES
Serge Junior presents today for   Chief Complaint   Patient presents with    Cough    Concern For COVID-19 (Coronavirus)       Is someone accompanying this pt? no    Is the patient using any DME equipment during OV? no    Depression Screening:  3 most recent PHQ Screens 4/22/2021   Little interest or pleasure in doing things Not at all   Feeling down, depressed, irritable, or hopeless Not at all   Total Score PHQ 2 0   Trouble falling or staying asleep, or sleeping too much Not at all   Feeling tired or having little energy Not at all   Poor appetite, weight loss, or overeating Not at all   Feeling bad about yourself - or that you are a failure or have let yourself or your family down Not at all   Trouble concentrating on things such as school, work, reading, or watching TV Not at all   Moving or speaking so slowly that other people could have noticed; or the opposite being so fidgety that others notice Not at all   Thoughts of being better off dead, or hurting yourself in some way Not at all   PHQ 9 Score 0   How difficult have these problems made it for you to do your work, take care of your home and get along with others Not difficult at all       Learning Assessment:  No flowsheet data found. Abuse Screening:  No flowsheet data found. Fall Risk  No flowsheet data found. Health Maintenance reviewed and discussed and ordered per Provider. Health Maintenance Due   Topic Date Due    Hepatitis C Screening  Never done    COVID-19 Vaccine (1) Never done    PAP AKA CERVICAL CYTOLOGY  Never done   . Coordination of Care:  1. Have you been to the ER, urgent care clinic since your last visit? Hospitalized since your last visit? Yes  [facility]     2. Have you seen or consulted any other health care providers outside of the [address] [place] [address] since your last visit? Include any pap smears or colon screening.  no      Last  Checked no  Last UDS Checked no  Last Pain contract signed: no

## 2021-04-22 NOTE — PROGRESS NOTES
Janay Adams is a 27 y.o. female who was seen by synchronous (real-time) audio-video technology on 4/22/2021 for Cough and Concern For COVID-19 (Coronavirus)        Assessment & Plan:   Diagnoses and all orders for this visit:    1. SOB (shortness of breath) on exertion  -     albuterol (PROVENTIL HFA, VENTOLIN HFA, PROAIR HFA) 90 mcg/actuation inhaler; Take 2 Puffs by inhalation every four (4) hours as needed for Wheezing, Shortness of Breath, Respiratory Distress or Cough. 2. Cough  -     albuterol (PROVENTIL HFA, VENTOLIN HFA, PROAIR HFA) 90 mcg/actuation inhaler; Take 2 Puffs by inhalation every four (4) hours as needed for Wheezing, Shortness of Breath, Respiratory Distress or Cough. 3. COVID-19  -     albuterol (PROVENTIL HFA, VENTOLIN HFA, PROAIR HFA) 90 mcg/actuation inhaler; Take 2 Puffs by inhalation every four (4) hours as needed for Wheezing, Shortness of Breath, Respiratory Distress or Cough. I spent at least 20 minutes on this visit with this established patient. [id number]  Subjective:     Patient is being established today as a new patient with me she was previously seen by Alvino Marin. Last appointment was August 2020. Patient tested for positive for COVID on 4/9/2021 and she continues to have SOB symptoms during exertion. Patient was seen in ER at [facility] on 4/19/2021. She had a chest xray and it was WNL and prescribed Tessalon Perles for a cough. COVID  Patient tested positive for COVID on 4/9/2021. She states she continues to have episodes of SOB during exertion and a lingering cough. She states the [employer] seem to be helping the cough. She denies having any chest pain. Today I will prescribe patient a albuterol rescue inhaler to use. Educated patient on the residual of COVID symptoms today.  Advised         Patient Active Problem List   Diagnosis Code    Morbid obesity with BMI of 40.0-44.9, adult ([facility]) E66.01, Z68.41    Herpes simplex B00.9  Prediabetes R73.03     Patient Active Problem List    Diagnosis Date Noted    Prediabetes 08/20/2020    Herpes simplex 03/01/2019    Morbid obesity with BMI of 40.0-44.9, adult ([facility] [address] 01/03/2018     No Known Allergies  History reviewed. No pertinent surgical history. Family History   Problem Relation Age of Onset    No Known Problems Mother     No Known Problems Father     Asthma Sister     Asthma Brother        Review of Systems   Constitutional: Negative for chills, fever and malaise/fatigue. Eyes: Negative. Respiratory: Positive for cough (intermittent) and shortness of breath (intermittent). Negative for wheezing. Cardiovascular: Negative for chest pain, palpitations and leg swelling. Musculoskeletal: Negative. Skin: Negative. Neurological: Negative. Objective:     Patient-Reported Vitals 4/22/2021   Patient-Reported Weight 230lb   Patient-Reported LMP 03-      General: alert, cooperative, no distress   Mental  status: normal mood, behavior, speech, dress, motor activity, and thought processes, able to follow commands   HENT: NCAT   Neck: no visualized mass   Resp: no respiratory distress   Neuro: no gross deficits   Skin: no discoloration or lesions of concern on visible areas   Psychiatric: normal affect, consistent with stated mood, no evidence of hallucinations     Additional exam findings: We discussed the expected course, resolution and complications of the diagnosis(es) in detail. Medication risks, benefits, costs, interactions, and alternatives were discussed as indicated. I advised her to contact the office if her condition worsens, changes or fails to improve as anticipated. She expressed understanding with the diagnosis(es) and plan.        Pedro Leal, who was evaluated through a patient-initiated, synchronous (real-time) audio-video encounter, and/or her healthcare decision maker, is aware that it is a billable service, with coverage as determined by her insurance carrier. She provided verbal consent to proceed: Yes, and patient identification was verified. It was conducted pursuant to the emergency declaration under the [address] [place] [address] and the [employer] Act. A caregiver was present when appropriate. Ability to conduct physical exam was limited. I was in the office. The patient was at home.       Brianne Hair NP

## 2022-03-20 PROBLEM — R73.03 PREDIABETES: Status: ACTIVE | Noted: 2020-08-20

## 2022-03-20 PROBLEM — E66.01 MORBID OBESITY WITH BMI OF 40.0-44.9, ADULT (HCC): Status: ACTIVE | Noted: 2018-01-03

## 2022-03-20 PROBLEM — B00.9 HERPES SIMPLEX: Status: ACTIVE | Noted: 2019-03-01

## 2023-05-12 RX ORDER — BENZONATATE 100 MG/1
CAPSULE ORAL
COMMUNITY
Start: 2021-04-19

## 2023-05-12 RX ORDER — ALBUTEROL SULFATE 90 UG/1
2 AEROSOL, METERED RESPIRATORY (INHALATION) EVERY 4 HOURS PRN
COMMUNITY
Start: 2021-04-22

## 2023-05-13 ENCOUNTER — HOSPITAL ENCOUNTER (EMERGENCY)
Facility: HOSPITAL | Age: 33
Discharge: HOME | End: 2023-05-13
Attending: STUDENT IN AN ORGANIZED HEALTH CARE EDUCATION/TRAINING PROGRAM

## 2023-05-13 VITALS
OXYGEN SATURATION: 97 % | HEART RATE: 103 BPM | SYSTOLIC BLOOD PRESSURE: 105 MMHG | DIASTOLIC BLOOD PRESSURE: 76 MMHG | WEIGHT: 178.79 LBS | BODY MASS INDEX: 36.04 KG/M2 | HEIGHT: 59 IN | RESPIRATION RATE: 20 BRPM | TEMPERATURE: 98.5 F

## 2023-05-13 DIAGNOSIS — K02.9 PAIN DUE TO DENTAL CARIES: Primary | ICD-10-CM

## 2023-05-13 PROCEDURE — 99283 EMERGENCY DEPT VISIT LOW MDM: CPT | Performed by: EMERGENCY MEDICINE

## 2023-05-13 RX ORDER — TRAMADOL HYDROCHLORIDE 50 MG/1
50 TABLET ORAL EVERY 6 HOURS PRN
Qty: 18 TABLET | Refills: 0 | Status: SHIPPED | OUTPATIENT
Start: 2023-05-13 | End: 2023-05-16

## 2023-05-13 RX ORDER — CLINDAMYCIN HYDROCHLORIDE 300 MG/1
300 CAPSULE ORAL 4 TIMES DAILY
Qty: 40 CAPSULE | Refills: 0 | Status: SHIPPED | OUTPATIENT
Start: 2023-05-13 | End: 2023-05-23

## 2023-05-13 RX ORDER — PREDNISONE 20 MG/1
40 TABLET ORAL DAILY
Qty: 8 TABLET | Refills: 0 | Status: SHIPPED | OUTPATIENT
Start: 2023-05-13 | End: 2023-05-17

## 2023-05-13 ASSESSMENT — ENCOUNTER SYMPTOMS
COUGH: 0
SHORTNESS OF BREATH: 0
FACIAL SWELLING: 1
ABDOMINAL PAIN: 0
VOMITING: 0
NAUSEA: 0
BACK PAIN: 0
DIARRHEA: 0
TROUBLE SWALLOWING: 0

## 2023-05-13 ASSESSMENT — LIFESTYLE VARIABLES: HOW OFTEN DO YOU HAVE A DRINK CONTAINING ALCOHOL: NEVER

## 2023-05-13 ASSESSMENT — PAIN - FUNCTIONAL ASSESSMENT: PAIN_FUNCTIONAL_ASSESSMENT: NONE - DENIES PAIN

## 2023-05-13 NOTE — ED PROVIDER NOTES
recommended or return to the Emergency Room should her condition change prior to follow-up. Discharge instructions have also been provided to the patient with some educational information regarding her diagnosis as well a list of reasons why she would want to return to the ER prior to her follow-up appointment should  her condition change. AYLA Juarez NP          CONSULTS:  None    PROCEDURES:  Unless otherwise noted below, none     Procedures      FINAL IMPRESSION      1. Pain due to dental caries          DISPOSITION/PLAN   DISPOSITION Decision To Discharge 05/13/2023 08:44:34 AM      PATIENT REFERRED TO:  [address] [place] [address]  [phone]  In 2 days  re-evaluation    Kalpesh Hoffmann, DDS  [address] [phone]      As needed, If symptoms worsen      DISCHARGE MEDICATIONS:  New Prescriptions    CLINDAMYCIN (CLEOCIN) 300 MG CAPSULE    Take 1 capsule by mouth 4 times daily for 10 days    PREDNISONE (DELTASONE) 20 MG TABLET    Take 2 tablets by mouth daily for 4 days    TRAMADOL (ULTRAM) 50 MG TABLET    Take 1 tablet by mouth every 6 hours as needed for Pain for up to 3 days. Intended supply: 3 days.  Take lowest dose possible to manage pain Max Daily Amount: 200 mg         (Please note that portions of this note were completed with a voice recognition program.  Efforts were made to edit the dictations but occasionally words are mis-transcribed.)    AYLA Juarez NP (electronically signed)  Emergency Attending Physician / Physician Assistant / Nurse Practitioner             AYLA Juarez NP  05/13/23 1329

## 2023-05-13 NOTE — ED TRIAGE NOTES
Patient is coming in with left sided face swelling since yesterday morning. Patient states woke up with dental pain on Thursday morning. Patient denies pain unless touched.